# Patient Record
Sex: MALE | Race: AMERICAN INDIAN OR ALASKA NATIVE
[De-identification: names, ages, dates, MRNs, and addresses within clinical notes are randomized per-mention and may not be internally consistent; named-entity substitution may affect disease eponyms.]

---

## 2018-02-05 ENCOUNTER — HOSPITAL ENCOUNTER (EMERGENCY)
Dept: HOSPITAL 5 - ED | Age: 53
Discharge: HOME | End: 2018-02-05
Payer: SELF-PAY

## 2018-02-05 VITALS — SYSTOLIC BLOOD PRESSURE: 168 MMHG | DIASTOLIC BLOOD PRESSURE: 110 MMHG

## 2018-02-05 DIAGNOSIS — R73.01: ICD-10-CM

## 2018-02-05 DIAGNOSIS — I10: Primary | ICD-10-CM

## 2018-02-05 LAB
ALBUMIN SERPL-MCNC: 4.6 G/DL (ref 3.9–5)
ALT SERPL-CCNC: 23 UNITS/L (ref 7–56)
BASOPHILS # (AUTO): 0 K/MM3 (ref 0–0.1)
BASOPHILS NFR BLD AUTO: 0.2 % (ref 0–1.8)
BUN SERPL-MCNC: 14 MG/DL (ref 9–20)
BUN/CREAT SERPL: 13 %
CALCIUM SERPL-MCNC: 9.3 MG/DL (ref 8.4–10.2)
EOSINOPHIL # BLD AUTO: 0.1 K/MM3 (ref 0–0.4)
EOSINOPHIL NFR BLD AUTO: 0.7 % (ref 0–4.3)
HCT VFR BLD CALC: 42.8 % (ref 35.5–45.6)
HEMOLYSIS INDEX: 11
HGB BLD-MCNC: 14.6 GM/DL (ref 11.8–15.2)
LYMPHOCYTES # BLD AUTO: 1.7 K/MM3 (ref 1.2–5.4)
LYMPHOCYTES NFR BLD AUTO: 23.9 % (ref 13.4–35)
MCH RBC QN AUTO: 32 PG (ref 28–32)
MCHC RBC AUTO-ENTMCNC: 34 % (ref 32–34)
MCV RBC AUTO: 93 FL (ref 84–94)
MONOCYTES # (AUTO): 0.4 K/MM3 (ref 0–0.8)
MONOCYTES % (AUTO): 6.1 % (ref 0–7.3)
PLATELET # BLD: 144 K/MM3 (ref 140–440)
RBC # BLD AUTO: 4.6 M/MM3 (ref 3.65–5.03)

## 2018-02-05 PROCEDURE — 99283 EMERGENCY DEPT VISIT LOW MDM: CPT

## 2018-02-05 PROCEDURE — 85025 COMPLETE CBC W/AUTO DIFF WBC: CPT

## 2018-02-05 PROCEDURE — 80053 COMPREHEN METABOLIC PANEL: CPT

## 2018-02-05 PROCEDURE — 36415 COLL VENOUS BLD VENIPUNCTURE: CPT

## 2018-02-05 NOTE — EMERGENCY DEPARTMENT REPORT
HPI





- General


Chief Complaint: High BP


Time Seen by Provider: 02/05/18 09:55





- HPI


HPI: 





52-year-old -American male comes in for states that his blood pressures 

is high.  Patient reports that his blood pressure was elevated on Saturday when 

he first noticed it.  He denies any headaches no change in vision no chest pain 

no nausea no vomiting no fever no chills.  His blood pressure in triage is 152/

90 patient also denies any shortness of breathing.  He has no past medical 

history currently takes no medications and has no known drug allergies





ED Past Medical Hx





- Past Medical History


Previous Medical History?: No





- Surgical History


Past Surgical History?: No





- Social History


Smoking Status: Never Smoker


Substance Use Type: Alcohol





- Medications


Home Medications: 


 Home Medications











 Medication  Instructions  Recorded  Confirmed  Last Taken  Type


 


Lisinopril [Zestril] 10 mg PO QDAY 30 Days #30 tablet 02/05/18  Unknown Rx














ED Review of Systems


ROS: 


Stated complaint: HYPERTENSIVE


Other details as noted in HPI








Physical Exam





- Physical Exam


Vital Signs: 


 Vital Signs











  02/05/18





  08:48


 


Temperature 98.3 F


 


Pulse Rate 60


 


Respiratory 16





Rate 


 


Blood Pressure 152/90


 


O2 Sat by Pulse 98





Oximetry 











Physical Exam: 





GENERAL APPEARANCE: Well developed, well nourished, in no acute distress.


SKIN: Inspection of the skin reveals no rashes, ulcerations or petechiae.


HEENT: The sclerae were anicteric and conjunctivae were pink and moist. 

Extraocular movements were intact and pupils were equal, round, and reactive to 

light with normal accommodation. External inspection of the ears and nose 

showed no scars, lesions, or masses. Lips, teeth, and gums showed normal 

mucosa. The oral mucosa, hard and soft palate, tongue and posterior pharynx 

were normal.


NECK: Supple and symmetric. There was no thyroid enlargement, and no tenderness

, or masses were felt.


CHEST: Normal AP diameter and normal contour without any kyphoscoliosis.


LUNGS: Auscultation of the lungs revealed normal breath sounds without any 

other adventitious sounds or rubs.


CARDIOVASCULAR: There was a regular rate and rhythm without any murmurs, gallops

, rubs. The carotid pulses were normal and 2+ bilaterally without bruits. 

Peripheral pulses were 2+ and symmetric.


ABDOMEN: Soft and nontender with normal bowel sounds. The liver span was 

approximately 5-6 cm in the right midclavicular line by percussion. The liver 

edge was nontender. The spleen was not palpable. There were no inguinal or 

umbilical hernias noted. No ascites was noted.


LYMPH NODES: No lymphadenopathy was appreciated in the neck, axillae or groin.


MUSCULOSKELETAL: Gait was normal. There was no tenderness or effusions noted. 

Muscle strength and tone were normal.


EXTREMITIES: No cyanosis, clubbing or edema.


NEUROLOGIC: Alert and oriented x 3. Normal affect. Gait was normal. Normal deep 

tendon reflexes with no pathological reflexes. Sensation to touch was normal. 








ED Course


 Vital Signs











  02/05/18





  08:48


 


Temperature 98.3 F


 


Pulse Rate 60


 


Respiratory 16





Rate 


 


Blood Pressure 152/90


 


O2 Sat by Pulse 98





Oximetry 














ED Medical Decision Making





- Lab Data


Result diagrams: 


 02/05/18 10:13





 02/05/18 10:13





- Medical Decision Making





Patient has been evaluated by this provider fast track.  Discussed the patient 

will get a baseline labs the CBC CMP.  His kidney functions are stable and has 

no other reason for elevation of blood pressure we will start patient on a low-

dose of lisinopril 10 mg daily and have him follow-up with his primary care 

provider.  Patient verbalized understanding. 


Critical care attestation.: 


If time is entered above; I have spent that time in minutes in the direct care 

of this critically ill patient, excluding procedure time.








ED Disposition


Clinical Impression: 


 Elevated glucose





Hypertension


Qualifiers:


 Hypertension type: unspecified Qualified Code(s): I10 - Essential (primary) 

hypertension





Disposition: DC-01 TO HOME OR SELFCARE


Is pt being admited?: No


Does the pt Need Aspirin: No


Condition: Stable


Instructions:  Hypertension (ED), DASH Eating Plan (ED)


Additional Instructions: 


Please take the lisinopril 10 mg continue to monitor blood pressure is very 

important free to follow up with your primary care doctor this week.  Be aware 

that her blood sugar with a little elevated so he did need further evaluation 

which all come be done by her primary care provider.


Prescriptions: 


Lisinopril [Zestril] 10 mg PO QDAY 30 Days #30 tablet


Referrals: 


PRIMARY CARE,MD [Primary Care Provider] - 3-5 Days


Forms:  Work/School Release Form(ED)

## 2019-09-26 ENCOUNTER — HOSPITAL ENCOUNTER (EMERGENCY)
Dept: HOSPITAL 5 - ED | Age: 54
LOS: 1 days | Discharge: HOME | End: 2019-09-27
Payer: COMMERCIAL

## 2019-09-26 VITALS — DIASTOLIC BLOOD PRESSURE: 100 MMHG | SYSTOLIC BLOOD PRESSURE: 152 MMHG

## 2019-09-26 DIAGNOSIS — R50.9: ICD-10-CM

## 2019-09-26 DIAGNOSIS — I10: ICD-10-CM

## 2019-09-26 DIAGNOSIS — Z79.899: ICD-10-CM

## 2019-09-26 DIAGNOSIS — R11.0: ICD-10-CM

## 2019-09-26 DIAGNOSIS — R19.7: Primary | ICD-10-CM

## 2019-09-26 LAB
ALBUMIN SERPL-MCNC: 4.4 G/DL (ref 3.9–5)
ALT SERPL-CCNC: 47 UNITS/L (ref 7–56)
BUN SERPL-MCNC: 14 MG/DL (ref 9–20)
BUN/CREAT SERPL: 11 %
CALCIUM SERPL-MCNC: 9.2 MG/DL (ref 8.4–10.2)
HCT VFR BLD CALC: 46 % (ref 35.5–45.6)
HEMOLYSIS INDEX: 5
HGB BLD-MCNC: 15.1 GM/DL (ref 11.8–15.2)
MCHC RBC AUTO-ENTMCNC: 33 % (ref 32–34)
MCV RBC AUTO: 94 FL (ref 84–94)
PLATELET # BLD: 130 K/MM3 (ref 140–440)
RBC # BLD AUTO: 4.89 M/MM3 (ref 3.65–5.03)

## 2019-09-26 PROCEDURE — 36415 COLL VENOUS BLD VENIPUNCTURE: CPT

## 2019-09-26 PROCEDURE — 85027 COMPLETE CBC AUTOMATED: CPT

## 2019-09-26 PROCEDURE — 80053 COMPREHEN METABOLIC PANEL: CPT

## 2019-09-26 NOTE — EMERGENCY DEPARTMENT REPORT
Vomiting/Diarrhea





- HPI


Chief Complaint: Nausea/Vomiting/Diarrhea


Stated Complaint: DIARRHEA, NAUSEA X'S 2 DAYS


Time Seen by Provider: 09/26/19 21:49


Duration: 2 Days


Diarrhea Severity: Moderate


Pain Severity: None


Symptoms: Yes Watery Diarrhea, Yes Fever, Yes Able to Tolerate Fluids, Yes 

Recent Unusual Foods ([employer]), No Bloody diarrhea, No Recent use of 

Antibiotics, No Family w/ Similar Symptoms, No Contacts w/ Similar Symptoms, No 

Rash, No Hematuria, No Recent URI Symptoms


Other History: 53-year-old -American male presents to the emergency room 

complaining of diarrhea and fever 2 days.  Patient denies any abdominal pain.  

Patient reports that his last loose stool was prior to arrival.  Patient has 

taken nothing for his symptoms.  Patient reports that he had [employer] the 

other day and after that he started having nausea and diarrhea.  Patient denies 

any nausea at this time.  Patient reports he needs a refill on his amlodipine 10

mg.





ED Review of Systems


ROS: 


Stated complaint: DIARRHEA, NAUSEA X'S 2 DAYS


Other details as noted in HPI





Comment: All other systems reviewed and negative


Gastrointestinal: diarrhea





ED Past Medical Hx





- Past Medical History


Previous Medical History?: Yes


Hx Hypertension: Yes





- Surgical History


Past Surgical History?: No





- Social History


Smoking Status: Never Smoker


Substance Use Type: None





- Medications


Home Medications: 


                                Home Medications











 Medication  Instructions  Recorded  Confirmed  Last Taken  Type


 


Loperamide [Imodium] 2 mg PO Q2HR PRN #4 capsule 09/26/19  Unknown Rx


 


Losartan [Cozaar] 50 mg PO QDAY 2 Days #30 tablet 09/26/19  Unknown Rx


 


amLODIPine [Norvasc] 10 mg PO DAILY 09/26/19 09/26/19 Unknown History


 


amLODIPine [Norvasc] 10 mg PO DAILY #30 tab 09/26/19  Unknown Rx














Vomiting Diarrhea Exam





- Exam


General: 


Vital signs noted. No distress. Alert and acting appropriately.





HEENT: Yes Moist Mucous Membranes, No Pharyngeal Erythema, No Pharyngeal 

Exudates, No Rhinorrhea, No Conjuctival Injection, No Frontal Tenderness, No 

Maxillary Tenderness


Neck: No Adenopathy, No Rigidity


Lungs: Yes Clear Lung Sounds, Yes Good Air Exchange, No Wheezes, No Stridor, No 

Cough, No Nasal Flaring, No Retractions, No Use of Accessory Muscles


Heart exam: Regular: Yes, Murmur: No, Tachycardia: No


Abdomen: Tenderness: No, Peritoneal Signs: No, Distention: No, Hyperactive Bowel

 sounds: No


Skin exam: Rash: No, Edema: No, Normal turgor: Yes


Neurologic: 


Alert and oriented, no deficits.








Musculoskeletal: 


Unremarkable.











ED Course


                                   Vital Signs











  09/26/19





  21:35


 


Temperature 98.3 F


 


Pulse Rate 91 H


 


Respiratory 18





Rate 


 


Blood Pressure 152/100


 


O2 Sat by Pulse 96





Oximetry 














ED Medical Decision Making





- Lab Data


Result diagrams: 


                                 09/26/19 22:01





                                 09/26/19 22:01





- Medical Decision Making





53-year-old -American male presents to the emergency room complaining of 

diarrhea and fever 2 days.  Patient denies any abdominal pain.  Patient reports

 that his last loose stool was prior to arrival.  Patient has taken nothing for 

his symptoms.  Patient reports that he had [employer] the other day and after 

that he started having nausea and diarrhea.  Patient denies any nausea at this 

time.  Patient reports he needs a refill on his amlodipine 10 mg.








Patient will be given Imodium 2 mg now


Critical care attestation.: 


If time is entered above; I have spent that time in minutes in the direct care 

of this critically ill patient, excluding procedure time.








ED Disposition


Clinical Impression: 


Diarrhea


Qualifiers:


 Diarrhea type: unspecified type Qualified Code(s): R19.7 - Diarrhea, 

unspecified





HTN (hypertension)


Qualifiers:


 Hypertension type: essential hypertension Qualified Code(s): I10 - Essential 

(primary) hypertension





Disposition: DC-01 TO HOME OR SELFCARE


Is pt being admited?: No


Does the pt Need Aspirin: No


Condition: Stable


Instructions:  Loperamide (By mouth), Acute Diarrhea (ED), Hypertension (ED)


Prescriptions: 


Losartan [Cozaar] 50 mg PO QDAY 2 Days #30 tablet


Loperamide [Imodium] 2 mg PO Q2HR PRN #4 capsule


 PRN Reason: Diarrhea


amLODIPine [Norvasc] 10 mg PO DAILY #30 tab


Referrals: 


SRINATH ALMONTE MD [Staff Physician] - 3-5 Days


Forms:  Work/School Release Form(ED)

## 2019-09-26 NOTE — EVENT NOTE
ED Screening Note


Date of service: 09/26/19


Time: 21:49


ED Screening Note: 





This is a 53 y.o. M. that presents to the ER with N/D and fever x 2 days.





Reports eating a burger at [employer] 2 days ago and sick every since.





Reports fever and abdominal pain resolved.





+ diarrhea





Taking pepto bismol.





PMH of HTN





This initial assessment/diagnostic orders/clinical plan/treatment(s) is/are 

subject to change based on patients health status, clinical progression and re-

assessment by fellow clinical providers in the ED. Further treatment and workup 

at subsequent clinical providers discretion. Patient/guardian urged not to elope

from the ED as their condition may be serious if not clinically assessed and 

managed. 





Initial orders include:

## 2019-11-25 ENCOUNTER — HOSPITAL ENCOUNTER (EMERGENCY)
Dept: HOSPITAL 5 - ED | Age: 54
Discharge: HOME | End: 2019-11-25
Payer: COMMERCIAL

## 2019-11-25 VITALS — SYSTOLIC BLOOD PRESSURE: 143 MMHG | DIASTOLIC BLOOD PRESSURE: 105 MMHG
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
## 2019-11-25 DIAGNOSIS — S20.219A: Primary | ICD-10-CM
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
## 2019-11-25 LAB
ALBUMIN SERPL-MCNC: 5 G/DL (ref 3.9–5)
ALT SERPL-CCNC: 38 UNITS/L (ref 7–56)
BUN SERPL-MCNC: 12 MG/DL (ref 9–20)
BUN/CREAT SERPL: 11 %
CALCIUM SERPL-MCNC: 10.4 MG/DL (ref 8.4–10.2)
HEMOLYSIS INDEX: 10

## 2019-11-25 PROCEDURE — 36415 COLL VENOUS BLD VENIPUNCTURE: CPT

## 2019-11-25 PROCEDURE — 71275 CT ANGIOGRAPHY CHEST: CPT

## 2019-11-25 PROCEDURE — 99284 EMERGENCY DEPT VISIT MOD MDM: CPT

## 2019-11-25 PROCEDURE — 80053 COMPREHEN METABOLIC PANEL: CPT
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
## 2020-10-22 ENCOUNTER — HOSPITAL ENCOUNTER (EMERGENCY)
Dept: HOSPITAL 5 - ED | Age: 55
Discharge: LEFT BEFORE DISCHARGE | End: 2020-10-22
Payer: COMMERCIAL

## 2020-10-22 ENCOUNTER — HOSPITAL ENCOUNTER (EMERGENCY)
Dept: HOSPITAL 5 - ED | Age: 55
Discharge: HOME | End: 2020-10-22
Payer: COMMERCIAL

## 2020-10-22 VITALS — SYSTOLIC BLOOD PRESSURE: 166 MMHG | DIASTOLIC BLOOD PRESSURE: 98 MMHG

## 2020-10-22 DIAGNOSIS — Z79.899: ICD-10-CM

## 2020-10-22 DIAGNOSIS — I10: Primary | ICD-10-CM

## 2020-10-22 DIAGNOSIS — Z53.21: ICD-10-CM

## 2020-10-22 PROCEDURE — 99281 EMR DPT VST MAYX REQ PHY/QHP: CPT

## 2020-10-22 NOTE — EMERGENCY DEPARTMENT REPORT
ED General Adult HPI





- General


Chief complaint: Extremity Injury, Lower


Stated complaint: HYPERTENSIVE


Time Seen by Provider: 10/22/20 16:09


Source: patient


Mode of arrival: Ambulatory


Limitations: No Limitations





- History of Present Illness


Initial comments: 





Patient is a 54-year-old male presents emergency room with complaints of 

elevated blood pressure that began yesterday.  He states that he has not taken 

his blood pressure medication since December 2019.  He states that he just had a

feeling that his blood pressure was elevated.  He denies any headache, vision 

changes, numbness, weakness, chest pain, shortness of breath, tingling.  He 

denies any other past medical history.  He states he supposed to be taking 

amlodipine 10 mg daily.  He denies any other past medical history.  No allergies

medications.  He states that he went on vacation and drank more than he usually 

does and believes that is what set off his blood pressure.


Severity scale (0 -10): 4





- Related Data


                                Home Medications











 Medication  Instructions  Recorded  Confirmed  Last Taken


 


amLODIPine 10 mg PO DAILY 09/26/19 09/26/19 Unknown








                                  Previous Rx's











 Medication  Instructions  Recorded  Last Taken  Type


 


Loperamide [Imodium] 2 mg PO Q2HR PRN #4 capsule 09/26/19 Unknown Rx


 


Losartan [Cozaar] 50 mg PO QDAY 2 Days #30 tablet 09/26/19 Unknown Rx


 


amLODIPine 10 mg PO DAILY #30 tab 09/26/19 Unknown Rx


 


Ibuprofen [Motrin 800 MG tab] 800 mg PO Q8HR PRN #30 tablet 11/25/19 Unknown Rx


 


amLODIPine 10 mg PO DAILY #30 tablet 10/22/20 Unknown Rx











                                    Allergies











Allergy/AdvReac Type Severity Reaction Status Date / Time


 


No Known Allergies Allergy   Verified 11/25/19 20:56














ED Review of Systems


ROS: 


Stated complaint: HYPERTENSIVE


Other details as noted in HPI





Comment: All other systems reviewed and negative





ED Past Medical Hx





- Past Medical History


Previous Medical History?: Yes


Hx Hypertension: Yes





- Surgical History


Past Surgical History?: No





- Social History


Smoking Status: Never Smoker


Substance Use Type: Alcohol





- Medications


Home Medications: 


                                Home Medications











 Medication  Instructions  Recorded  Confirmed  Last Taken  Type


 


Loperamide [Imodium] 2 mg PO Q2HR PRN #4 capsule 09/26/19  Unknown Rx


 


Losartan [Cozaar] 50 mg PO QDAY 2 Days #30 tablet 09/26/19  Unknown Rx


 


amLODIPine 10 mg PO DAILY 09/26/19 09/26/19 Unknown History


 


amLODIPine 10 mg PO DAILY #30 tab 09/26/19  Unknown Rx


 


Ibuprofen [Motrin 800 MG tab] 800 mg PO Q8HR PRN #30 tablet 11/25/19  Unknown Rx


 


amLODIPine 10 mg PO DAILY #30 tablet 10/22/20  Unknown Rx














ED Physical Exam





- General


Limitations: No Limitations


General appearance: alert, in no apparent distress





- Head


Head exam: Present: atraumatic, normocephalic





- Eye


Eye exam: Present: normal appearance





- ENT


ENT exam: Present: mucous membranes moist





- Respiratory


Respiratory exam: Present: normal lung sounds bilaterally.  Absent: respiratory 

distress, wheezes, rales, rhonchi, stridor, chest wall tenderness, accessory 

muscle use, decreased breath sounds, prolonged expiratory





- Cardiovascular


Cardiovascular Exam: Present: regular rate, normal rhythm, normal heart sounds. 

Absent: systolic murmur, diastolic murmur, rubs, gallop





- Neurological Exam


Neurological exam: Present: alert, oriented X3, CN II-XII intact, normal gait.  

Absent: motor sensory deficit





- Psychiatric


Psychiatric exam: Present: normal affect, normal mood





- Skin


Skin exam: Present: warm, dry, intact





ED Course


                                   Vital Signs











  10/22/20





  13:48


 


Temperature 97.9 F


 


Pulse Rate 78


 


Respiratory 18





Rate 


 


Blood Pressure 166/98





[Right] 


 


O2 Sat by Pulse 98





Oximetry 














ED Medical Decision Making





- Medical Decision Making





Patient is a 54-year-old male presents emergency room with complaints of 

elevated blood pressure that began yesterday.  He states that he has not taken 

his blood pressure medication since December 2019.  He states that he just had a

 feeling that his blood pressure was elevated.  He denies any headache, vision 

changes, numbness, weakness, chest pain, shortness of breath, tingling.  He 

denies any other past medical history.  He states he supposed to be taking 

amlodipine 10 mg daily.  He denies any other past medical history.  No allergies

 medications.  He states that he went on vacation and drank more than he usually

 does and believes that is what set off his blood pressure.  Vitals with 

elevated blood pressure, otherwise stable.  Patient is noncompliant with his 

blood pressure medication.  No abnormality on physical examination as documented

 in chart.  Patient does not have any clinical signs of hypertensive 

urgency/emergency.  Patient given a 1 month refill supply of his home 

medication.  Advised patient Please take medication as prescribed.  Please keep 

a blood pressure log and take this to the primary care doctor.  Increase your 

water intake.  Eat a low-sodium/low salt diet.  Incorporate 30 to 60 minutes of 

aerobic exercise daily.  Avoid alcohol abuse.  Return to the emergency room for 

any new or worsening symptoms.


Critical care attestation.: 


If time is entered above; I have spent that time in minutes in the direct care 

of this critically ill patient, excluding procedure time.








ED Disposition


Clinical Impression: 


Hypertension


Qualifiers:


 Hypertension type: unspecified Qualified Code(s): I10 - Essential (primary) 

hypertension





Disposition: DC-01 TO HOME OR SELFCARE


Is pt being admited?: No


Does the pt Need Aspirin: No


Condition: Stable


Instructions:  Low Sodium Diet (ED), Hypertension (ED)


Additional Instructions: 


Please take medication as prescribed.  Please keep a blood pressure log and take

 this to the primary care doctor.  Increase your water intake.  Eat a low-

sodium/low salt diet.  Incorporate 30 to 60 minutes of aerobic exercise daily.  

Avoid alcohol abuse.  Return to the emergency room for any new or worsening 

symptoms.


Prescriptions: 


amLODIPine 10 mg PO DAILY #30 tablet


Referrals: 


your, primary care doctor [Other] - 2-3 Days


Forms:  Work/School Release Form(ED)


Time of Disposition: 16:11


Print Language: ENGLISH

## 2020-12-04 ENCOUNTER — HOSPITAL ENCOUNTER (EMERGENCY)
Dept: HOSPITAL 5 - ED | Age: 55
Discharge: HOME | End: 2020-12-04
Payer: COMMERCIAL

## 2020-12-04 VITALS — SYSTOLIC BLOOD PRESSURE: 182 MMHG | DIASTOLIC BLOOD PRESSURE: 105 MMHG

## 2020-12-04 DIAGNOSIS — I10: Primary | ICD-10-CM

## 2020-12-04 DIAGNOSIS — Z79.899: ICD-10-CM

## 2020-12-04 PROCEDURE — 99281 EMR DPT VST MAYX REQ PHY/QHP: CPT

## 2021-02-19 ENCOUNTER — HOSPITAL ENCOUNTER (EMERGENCY)
Dept: HOSPITAL 5 - ED | Age: 56
Discharge: HOME | End: 2021-02-19
Payer: COMMERCIAL

## 2021-02-19 VITALS — DIASTOLIC BLOOD PRESSURE: 102 MMHG | SYSTOLIC BLOOD PRESSURE: 159 MMHG

## 2021-02-19 DIAGNOSIS — Z79.899: ICD-10-CM

## 2021-02-19 DIAGNOSIS — M25.562: Primary | ICD-10-CM

## 2021-02-19 DIAGNOSIS — I10: ICD-10-CM

## 2021-02-19 PROCEDURE — 99283 EMERGENCY DEPT VISIT LOW MDM: CPT

## 2021-02-19 NOTE — XRAY REPORT
Left knee-3 views



INDICATION:  l knee pain.



COMPARISON: None.



IMPRESSION:  Generalized soft tissue swelling along the anterior aspect of the knee with no obvious s
oft tissue wound or radiopaque foreign body identified.  No acute fracture or malalignment. Mild medi
al compartment DJD.



Signer Name: Jaciel Boo MD 

Signed: 2/19/2021 7:59 AM

Workstation Name: [id number]

## 2021-02-19 NOTE — EMERGENCY DEPARTMENT REPORT
ED General Adult HPI





- General


Stated complaint: LT KNEE SWOLLEN


PUI?: No


Time Seen by Provider: 02/19/21 07:11





- History of Present Illness


Initial comments: 





55-year-old male presents with chief complaint of left knee pain, gradual onset 

over the past 3 days.  According to the patient he was at work on Tuesday when 

he was moving a shelf and a box fell down hitting him on the knee.  He states 

initially it did not hurt very much and he was able to continue doing his job 

but yesterday the pain began to increase.  He reports associated swelling though

states that has improved after icing the knee.  Pain is worse with movement

/ambulation, better with rest.  Rated as moderate.  No other injuries or 

complaints.  No numbness or weakness.





- Related Data


                                  Previous Rx's











 Medication  Instructions  Recorded  Last Taken  Type


 


Loperamide [Imodium] 2 mg PO Q2HR PRN #4 capsule 09/26/19 Unknown Rx


 


amLODIPine 10 mg PO DAILY #30 tab 09/26/19 Unknown Rx


 


Ibuprofen [Motrin 800 MG tab] 800 mg PO Q8HR PRN #30 tablet 11/25/19 Unknown Rx


 


amLODIPine 10 mg PO DAILY #30 tablet 10/22/20 Unknown Rx


 


Losartan [Cozaar] 50 mg PO QDAY #90 tablet 12/04/20 Unknown Rx


 


amLODIPine 10 mg PO DAILY #90 tab 12/04/20 Unknown Rx


 


Acetaminophen/Codeine [Tylenol 1 tab PO Q6H PRN #12 tab 02/19/21 Unknown Rx





/Codeine # 3 tab]    


 


Naproxen [Naprosyn] 500 mg PO BID #20 tablet 02/19/21 Unknown Rx











                                    Allergies











Allergy/AdvReac Type Severity Reaction Status Date / Time


 


No Known Allergies Allergy   Verified 12/04/20 09:12














ED Review of Systems


ROS: 


Stated complaint: LT KNEE SWOLLEN


Other details as noted in HPI





Comment: All other systems reviewed and negative


Musculoskeletal: as per HPI





ED Past Medical Hx





- Past Medical History


Hx Hypertension: Yes





- Social History


Smoking Status: Never Smoker


Substance Use Type: Alcohol





- Medications


Home Medications: 


                                Home Medications











 Medication  Instructions  Recorded  Confirmed  Last Taken  Type


 


Loperamide [Imodium] 2 mg PO Q2HR PRN #4 capsule 09/26/19  Unknown Rx


 


amLODIPine 10 mg PO DAILY #30 tab 09/26/19  Unknown Rx


 


Ibuprofen [Motrin 800 MG tab] 800 mg PO Q8HR PRN #30 tablet 11/25/19  Unknown Rx


 


amLODIPine 10 mg PO DAILY #30 tablet 10/22/20  Unknown Rx


 


Losartan [Cozaar] 50 mg PO QDAY #90 tablet 12/04/20  Unknown Rx


 


amLODIPine 10 mg PO DAILY #90 tab 12/04/20  Unknown Rx


 


Acetaminophen/Codeine [Tylenol 1 tab PO Q6H PRN #12 tab 02/19/21  Unknown Rx





/Codeine # 3 tab]     


 


Naproxen [Naprosyn] 500 mg PO BID #20 tablet 02/19/21  Unknown Rx














ED Physical Exam





- General


Limitations: No Limitations


General appearance: alert, in no apparent distress





- Head


Head exam: Present: atraumatic, normocephalic





- Extremities Exam


Extremities exam: Present: tenderness, other (Mild swelling to the left knee 

with tenderness over the patella and patellar tendon, normal pulses distally)





- Back Exam


Back exam: Present: normal inspection, full ROM





- Neurological Exam


Neurological exam: Present: alert, oriented X3





- Psychiatric


Psychiatric exam: Present: normal affect, normal mood





- Skin


Skin exam: Present: warm, dry, intact





ED Course


                                   Vital Signs











  02/19/21





  07:12


 


Temperature 98.2 F


 


Pulse Rate 78


 


Respiratory 16





Rate 


 


Blood Pressure 159/102


 


O2 Sat by Pulse 100





Oximetry 














ED Medical Decision Making





- Radiology Data


Radiology results: report reviewed





Soft tissue swelling, no fracture of left knee





- Medical Decision Making





Patient presents with left knee pain after an injury.  On my exam reproducible 

pain over the patella and patellar tendon.  Remainder of lower extremity exam is

 normal, neurovascular intact.  X-ray pending.





Follow-up Ortho.  Knee brace given.





- Differential Diagnosis


Contusion, fracture, sprain


Critical care attestation.: 


If time is entered above; I have spent that time in minutes in the direct care 

of this critically ill patient, excluding procedure time.








ED Disposition


Clinical Impression: 


Left knee pain


Qualifiers:


 Chronicity: acute Qualified Code(s): M25.562 - Pain in left knee





Disposition: DC-01 TO HOME OR SELFCARE


Is pt being admited?: No


Condition: Stable


Instructions:  Acute Knee Pain, Adult


Prescriptions: 


Naproxen [Naprosyn] 500 mg PO BID #20 tablet


Acetaminophen/Codeine [Tylenol /Codeine # 3 tab] 1 tab PO Q6H PRN #12 tab


 PRN Reason: Pain , Severe (7-10)


Referrals: 


RAO BOOTH MD [Staff Physician] - 3-5 Days


Time of Disposition: 08:11

## 2021-04-08 ENCOUNTER — HOSPITAL ENCOUNTER (EMERGENCY)
Dept: HOSPITAL 5 - ED | Age: 56
Discharge: HOME | End: 2021-04-08
Payer: COMMERCIAL

## 2021-04-08 VITALS — SYSTOLIC BLOOD PRESSURE: 163 MMHG | DIASTOLIC BLOOD PRESSURE: 98 MMHG

## 2021-04-08 DIAGNOSIS — Z76.0: ICD-10-CM

## 2021-04-08 DIAGNOSIS — M25.562: Primary | ICD-10-CM

## 2021-04-08 DIAGNOSIS — Z79.899: ICD-10-CM

## 2021-04-08 DIAGNOSIS — I10: ICD-10-CM

## 2021-04-08 PROCEDURE — 99282 EMERGENCY DEPT VISIT SF MDM: CPT

## 2021-04-08 NOTE — EMERGENCY DEPARTMENT REPORT
ED Extremity Problem HPI





- General


Chief complaint: Extremity Injury, Lower


Stated complaint: LEFT KNEE INJURY/PAIN


Time Seen by Provider: 04/08/21 07:35


Source: patient


Mode of arrival: Ambulatory


Limitations: Physical Limitation





- History of Present Illness


Initial comments: 


55-year-old male presents to the ER today with complaints of left knee pain. 


Patient states that about 1/2 months ago case of liquid coffee fell onto his 

left knee.  Patient states that he was seen and evaluated here.  Had x-rays 

which showed no dislocation or fractures.  He was given referral to Dr. Saavedra 

whom he followed up with.  He states that Dr. Saavedra during the effusion that he

had in the knee.  He was off for about a month and then felt better enough to 

return to work 2 weeks ago.  Patient states that this past Sunday when he woke 

up his left knee was stiff, painful and he was having difficulty walking.  He 

reports mild swelling to the knee.  He states that he has an appointment Dr. Saavedra this coming Monday but he took 3 days off, and his job is requiring him 

to have a work note. He denies any injury recently. He denies any bruising or 

erythema. He denies calf pain, chest pain or SOB. 


Patient also requesting refill on his BP meds. He states he has been out of his 

amlodipine for couple days. 


MD Complaint: joint swelling, joint paint, other (Left knee pain )


-: Gradual





- Related Data


                                  Previous Rx's











 Medication  Instructions  Recorded  Last Taken  Type


 


Loperamide [Imodium] 2 mg PO Q2HR PRN #4 capsule 09/26/19 Unknown Rx


 


amLODIPine 10 mg PO DAILY #30 tab 09/26/19 Unknown Rx


 


Losartan [Cozaar] 50 mg PO QDAY #90 tablet 12/04/20 Unknown Rx


 


amLODIPine 10 mg PO DAILY #90 tab 12/04/20 Unknown Rx


 


Acetaminophen/Codeine [Tylenol 1 tab PO Q6H PRN #12 tab 02/19/21 Unknown Rx





/Codeine # 3 tab]    


 


Naproxen [Naprosyn] 500 mg PO BID #20 tablet 02/19/21 Unknown Rx


 


Ibuprofen [Motrin 800 MG tab] 800 mg PO Q8HR PRN #30 tablet 04/08/21 Unknown Rx


 


amLODIPine 10 mg PO DAILY #30 tablet 04/08/21 Unknown Rx











                                    Allergies











Allergy/AdvReac Type Severity Reaction Status Date / Time


 


No Known Allergies Allergy   Verified 04/08/21 06:25














ED Review of Systems


ROS: 


Stated complaint: LEFT KNEE INJURY/PAIN


Other details as noted in HPI





Comment: All other systems reviewed and negative


Constitutional: denies: chills, fever


Eyes: denies: eye pain, eye discharge, vision change


ENT: denies: ear pain, throat pain, dental pain, hearing loss, epistaxis


Respiratory: denies: cough, shortness of breath, SOB with exertion, SOB at rest,

stridor, wheezing


Cardiovascular: denies: chest pain, palpitations, dyspnea on exertion, edema, 

syncope, paroxysmal nocturnal dyspnea


Endocrine: no symptoms reported


Gastrointestinal: denies: abdominal pain, nausea, vomiting, diarrhea, 

constipation, hematemesis, melena, hematochezia


Musculoskeletal: joint swelling, arthralgia


Neurological: denies: headache, weakness, numbness, paresthesias, confusion, 

abnormal gait, vertigo


Psychiatric: denies: anxiety, depression, auditory hallucinations, visual 

hallucinations, homicidal thoughts, suicidal thoughts


Hematological/Lymphatic: denies: easy bleeding, easy bruising





ED Past Medical Hx





- Past Medical History


Previous Medical History?: No


Hx Hypertension: Yes





- Surgical History


Past Surgical History?: No





- Social History


Smoking Status: Never Smoker


Substance Use Type: None





- Medications


Home Medications: 


                                Home Medications











 Medication  Instructions  Recorded  Confirmed  Last Taken  Type


 


Loperamide [Imodium] 2 mg PO Q2HR PRN #4 capsule 09/26/19  Unknown Rx


 


amLODIPine 10 mg PO DAILY #30 tab 09/26/19  Unknown Rx


 


Losartan [Cozaar] 50 mg PO QDAY #90 tablet 12/04/20  Unknown Rx


 


amLODIPine 10 mg PO DAILY #90 tab 12/04/20  Unknown Rx


 


Acetaminophen/Codeine [Tylenol 1 tab PO Q6H PRN #12 tab 02/19/21  Unknown Rx





/Codeine # 3 tab]     


 


Naproxen [Naprosyn] 500 mg PO BID #20 tablet 02/19/21  Unknown Rx


 


Ibuprofen [Motrin 800 MG tab] 800 mg PO Q8HR PRN #30 tablet 04/08/21  Unknown Rx


 


amLODIPine 10 mg PO DAILY #30 tablet 04/08/21  Unknown Rx














ED Physical Exam





- General


Limitations: Physical Limitation


General appearance: alert, in no apparent distress





- Head


Head exam: Present: atraumatic, normocephalic, normal inspection





- Respiratory


Respiratory exam: Present: normal lung sounds bilaterally.  Absent: respiratory 

distress





- Cardiovascular


Cardiovascular Exam: Present: regular rate, normal rhythm, normal heart sounds





- Extremities Exam


Extremities exam: Present: normal inspection, full ROM.  Absent: pedal edema, 

calf tenderness





- Expanded Lower Extremity Exam


  ** Left


Knee exam: Present: full ROM, tenderness, full knee extension.  Absent: normal 

inspection, swelling, abrasion, laceration, ecchymosis, deformity, crepidus, 

dislocation, erythema, effusion


Neuro vascular tendon exam: Present: no vascular compromise.  Absent: motor 

deficit, sensory deficit





- Back Exam


Back exam: Present: normal inspection





- Neurological Exam


Neurological exam: Present: alert, oriented X3, CN II-XII intact, normal gait





- Psychiatric


Psychiatric exam: Present: normal affect, normal mood





- Skin


Skin exam: Present: intact





ED Course


                                   Vital Signs











  04/08/21





  06:27


 


Temperature 98.3 F


 


Pulse Rate 89


 


Respiratory 18





Rate 


 


Blood Pressure 163/98


 


O2 Sat by Pulse 98





Oximetry 











Critical care attestation.: 


If time is entered above; I have spent that time in minutes in the direct care 

of this critically ill patient, excluding procedure time.








ED Disposition


Clinical Impression: 


 Knee pain, left, Medication refill





Disposition: DC-01 TO HOME OR SELFCARE


Is pt being admited?: No


Does the pt Need Aspirin: No


Condition: Stable


Instructions:  Acute Knee Pain, Adult, Medicine Refill at the Emergency 

Department


Additional Instructions: 


Continue to rest, and elevate knee. Take the motrin as prescribed. Take your 

blood pressure as prescribed. Keep your appointment with Dr Quentin santiago monday. 

Return to ED if worse. 


Prescriptions: 


amLODIPine 10 mg PO DAILY #30 tablet


Ibuprofen [Motrin 800 MG tab] 800 mg PO Q8HR PRN #30 tablet


 PRN Reason: Pain , Severe (7-10)


Referrals: 


PRIMARY CARE,MD [Primary Care Provider] - 3-5 Days


Forms:  Work/School Release Form(ED)


Time of Disposition: 07:46

## 2021-05-26 ENCOUNTER — HOSPITAL ENCOUNTER (OUTPATIENT)
Dept: HOSPITAL 5 - LAB | Age: 56
Discharge: HOME | End: 2021-05-26
Attending: INTERNAL MEDICINE
Payer: COMMERCIAL

## 2021-05-26 DIAGNOSIS — R25.2: ICD-10-CM

## 2021-05-26 DIAGNOSIS — Z13.29: ICD-10-CM

## 2021-05-26 DIAGNOSIS — E55.9: ICD-10-CM

## 2021-05-26 DIAGNOSIS — Z13.220: Primary | ICD-10-CM

## 2021-05-26 DIAGNOSIS — Z12.5: ICD-10-CM

## 2021-05-26 DIAGNOSIS — Z13.1: ICD-10-CM

## 2021-05-26 DIAGNOSIS — Z00.00: ICD-10-CM

## 2021-05-26 LAB
ALBUMIN SERPL-MCNC: 4.2 G/DL (ref 3.9–5)
ALT SERPL-CCNC: 27 UNITS/L (ref 7–56)
BASOPHILS # (AUTO): 0 K/MM3 (ref 0–0.1)
BASOPHILS NFR BLD AUTO: 0.3 % (ref 0–1.8)
BUN SERPL-MCNC: 13 MG/DL (ref 9–20)
BUN/CREAT SERPL: 12 %
CALCIUM SERPL-MCNC: 8.9 MG/DL (ref 8.4–10.2)
EOSINOPHIL # BLD AUTO: 0.1 K/MM3 (ref 0–0.4)
EOSINOPHIL NFR BLD AUTO: 1.7 % (ref 0–4.3)
HCT VFR BLD CALC: 41.6 % (ref 35.5–45.6)
HDLC SERPL-MCNC: 38 MG/DL (ref 40–59)
HEMOLYSIS INDEX: 16
HGB BLD-MCNC: 14.1 GM/DL (ref 11.8–15.2)
LYMPHOCYTES # BLD AUTO: 1.5 K/MM3 (ref 1.2–5.4)
LYMPHOCYTES NFR BLD AUTO: 22.3 % (ref 13.4–35)
MCHC RBC AUTO-ENTMCNC: 34 % (ref 32–34)
MCV RBC AUTO: 97 FL (ref 84–94)
MONOCYTES # (AUTO): 0.5 K/MM3 (ref 0–0.8)
MONOCYTES % (AUTO): 7.2 % (ref 0–7.3)
PLATELET # BLD: 155 K/MM3 (ref 140–440)
RBC # BLD AUTO: 4.29 M/MM3 (ref 3.65–5.03)

## 2021-05-26 PROCEDURE — 85025 COMPLETE CBC W/AUTO DIFF WBC: CPT

## 2021-05-26 PROCEDURE — 83036 HEMOGLOBIN GLYCOSYLATED A1C: CPT

## 2021-05-26 PROCEDURE — 84153 ASSAY OF PSA TOTAL: CPT

## 2021-05-26 PROCEDURE — 83735 ASSAY OF MAGNESIUM: CPT

## 2021-05-26 PROCEDURE — 36415 COLL VENOUS BLD VENIPUNCTURE: CPT

## 2021-05-26 PROCEDURE — 82607 VITAMIN B-12: CPT

## 2021-05-26 PROCEDURE — 82306 VITAMIN D 25 HYDROXY: CPT

## 2021-05-26 PROCEDURE — 84443 ASSAY THYROID STIM HORMONE: CPT

## 2021-05-26 PROCEDURE — 84100 ASSAY OF PHOSPHORUS: CPT

## 2021-05-26 PROCEDURE — 80053 COMPREHEN METABOLIC PANEL: CPT

## 2021-05-26 PROCEDURE — 80061 LIPID PANEL: CPT

## 2021-05-30 LAB — VITAMIN D2 SERPL-MCNC: <4 NG/ML

## 2021-09-08 ENCOUNTER — HOSPITAL ENCOUNTER (OUTPATIENT)
Dept: HOSPITAL 5 - LAB | Age: 56
Discharge: HOME | End: 2021-09-08
Attending: INTERNAL MEDICINE
Payer: COMMERCIAL

## 2021-09-08 DIAGNOSIS — E78.5: Primary | ICD-10-CM

## 2021-09-08 LAB — HDLC SERPL-MCNC: 38 MG/DL (ref 40–59)

## 2021-09-08 PROCEDURE — 80061 LIPID PANEL: CPT

## 2021-09-08 PROCEDURE — 36415 COLL VENOUS BLD VENIPUNCTURE: CPT

## 2021-10-05 ENCOUNTER — HOSPITAL ENCOUNTER (OUTPATIENT)
Dept: HOSPITAL 5 - MRI | Age: 56
Discharge: HOME | End: 2021-10-05
Attending: ORTHOPAEDIC SURGERY
Payer: COMMERCIAL

## 2021-10-05 DIAGNOSIS — R22.41: Primary | ICD-10-CM

## 2021-10-05 NOTE — MAGNETIC RESONANCE REPORT
MRI RIGHT FORELEG WITHOUT CONTRAST



HISTORY: Localized swelling/mass



COMPARISON: None. 



TECHNIQUE: Routine non-contrast MRI of the left forearm obtained.



CONTRAST: None.



FINDINGS:

Bone Marrow: No significant abnormality.

Muscles: No significant abnormality.

Subcutaneous soft tissues: Well encapsulated hyperintense T2/isointense T1 cystic mass within the sub
cutaneous soft tissues anterior lateral mid foreleg with some hypointense thin septations and hypoint
ense T2 foci within it. The lesion measures 1.9 cm in maximum diameter. No surrounding subcutaneous s
oft tissue edema or inflammation.



Additional Findings: None.



IMPRESSION:

1. Exophytic complex cystic mass within the subcutaneous soft tissues/dermis/epidermis of right mid l
ateral foreleg. The lesion is nonspecific. Favor epidermoid inclusion cyst. Other differential consid
erations would include atypical myxoid tumor or acute amount of fibrosarcoma protuberans. The lesion 
is easily amenable to surgical resection and can be shelled out easily with well defined capsule



[email or web address]/[email or web address]



Signer Name: Piero Chan MD 

Signed: 10/5/2021 4:22 PM

Workstation Name: [id number]

## 2021-10-28 ENCOUNTER — HOSPITAL ENCOUNTER (OUTPATIENT)
Dept: HOSPITAL 5 - OR | Age: 56
Discharge: HOME | End: 2021-10-28
Attending: ORTHOPAEDIC SURGERY
Payer: COMMERCIAL

## 2021-10-28 VITALS — DIASTOLIC BLOOD PRESSURE: 81 MMHG | SYSTOLIC BLOOD PRESSURE: 147 MMHG

## 2021-10-28 DIAGNOSIS — R22.41: Primary | ICD-10-CM

## 2021-10-28 DIAGNOSIS — Z79.899: ICD-10-CM

## 2021-10-28 DIAGNOSIS — I10: ICD-10-CM

## 2021-10-28 DIAGNOSIS — Z98.890: ICD-10-CM

## 2021-10-28 DIAGNOSIS — L72.11: ICD-10-CM

## 2021-10-28 DIAGNOSIS — Z20.822: ICD-10-CM

## 2021-10-28 PROCEDURE — U0003 INFECTIOUS AGENT DETECTION BY NUCLEIC ACID (DNA OR RNA); SEVERE ACUTE RESPIRATORY SYNDROME CORONAVIRUS 2 (SARS-COV-2) (CORONAVIRUS DISEASE [COVID-19]), AMPLIFIED PROBE TECHNIQUE, MAKING USE OF HIGH THROUGHPUT TECHNOLOGIES AS DESCRIBED BY CMS-2020-01-R: HCPCS

## 2021-10-28 PROCEDURE — 88307 TISSUE EXAM BY PATHOLOGIST: CPT

## 2021-10-28 PROCEDURE — 11403 EXC TR-EXT B9+MARG 2.1-3CM: CPT

## 2021-10-28 PROCEDURE — 88304 TISSUE EXAM BY PATHOLOGIST: CPT

## 2021-10-28 NOTE — ANESTHESIA CONSULTATION
Anesthesia Consult and Med Hx


Date of service: 10/28/21





- Airway


Anesthetic Teeth Evaluation: Good


ROM Head & Neck: Adequate


Mental/Hyoid Distance: Adequate


Mallampati Class: Class II


Intubation Access Assessment: Probably Good





- Pre-Operative Health Status


ASA Pre-Surgery Classification: ASA2


Proposed Anesthetic Plan: MAC





- Pre-Anesthesia Comment


Pre-Anesthesia Comments: Discussed MAC vs GA. Patient requests MAC but is 

agreeable to GA if necessary.





- Pulmonary


Hx Smoking: No


Hx Respiratory Symptoms: No


Hx Sleep Apnea: No (HIGH ON LAXMI  PRESCREEN)





- Cardiovascular System


Hx Hypertension: Yes (took amlodipine this morning)


Hx Heart Attack/AMI: No





- Central Nervous System


CVA: No





- Endocrine


Hx Renal Disease: No


Hx Liver Disease: No


Hx Insulin Dependent Diabetes: No


Hx Non-Insulin Dependent Diabetes: No


Hx Thyroid Disease: No





- Hematic


Hx Anemia: No


Hx Sickle Cell Disease: No





- Other Systems


Hx Obesity: No





- Additional Comments


Anesthesia Medical History Comments: No prior GA. No FHx anesthetic 

complications.

## 2022-01-20 ENCOUNTER — HOSPITAL ENCOUNTER (OUTPATIENT)
Dept: HOSPITAL 5 - LAB | Age: 57
Discharge: HOME | End: 2022-01-20
Attending: INTERNAL MEDICINE
Payer: COMMERCIAL

## 2022-01-20 DIAGNOSIS — E78.5: Primary | ICD-10-CM

## 2022-01-20 LAB — HDLC SERPL-MCNC: 40 MG/DL (ref 40–59)

## 2022-01-20 PROCEDURE — 80061 LIPID PANEL: CPT

## 2022-01-20 PROCEDURE — 36415 COLL VENOUS BLD VENIPUNCTURE: CPT

## 2022-06-17 ENCOUNTER — HOSPITAL ENCOUNTER (OUTPATIENT)
Dept: HOSPITAL 5 - LAB | Age: 57
Discharge: HOME | End: 2022-06-17
Attending: INTERNAL MEDICINE
Payer: COMMERCIAL

## 2022-06-17 DIAGNOSIS — E78.5: ICD-10-CM

## 2022-06-17 DIAGNOSIS — R53.83: ICD-10-CM

## 2022-06-17 DIAGNOSIS — R73.9: ICD-10-CM

## 2022-06-17 DIAGNOSIS — I10: ICD-10-CM

## 2022-06-17 DIAGNOSIS — E55.9: ICD-10-CM

## 2022-06-17 DIAGNOSIS — Z00.00: Primary | ICD-10-CM

## 2022-06-17 LAB
ALBUMIN SERPL-MCNC: 4.7 G/DL (ref 3.9–5)
ALT SERPL-CCNC: 38 UNITS/L (ref 7–56)
BASOPHILS # (AUTO): 0 K/MM3 (ref 0–0.1)
BASOPHILS NFR BLD AUTO: 0.5 % (ref 0–1.8)
BUN SERPL-MCNC: 17 MG/DL (ref 9–20)
BUN/CREAT SERPL: 12 %
CALCIUM SERPL-MCNC: 9.5 MG/DL (ref 8.4–10.2)
EOSINOPHIL # BLD AUTO: 0.1 K/MM3 (ref 0–0.4)
EOSINOPHIL NFR BLD AUTO: 1.3 % (ref 0–4.3)
HCT VFR BLD CALC: 42.1 % (ref 35.5–45.6)
HDLC SERPL-MCNC: 39 MG/DL (ref 40–59)
HEMOLYSIS INDEX: 10
HGB BLD-MCNC: 14.3 GM/DL (ref 11.8–15.2)
LYMPHOCYTES # BLD AUTO: 1.8 K/MM3 (ref 1.2–5.4)
LYMPHOCYTES NFR BLD AUTO: 23.7 % (ref 13.4–35)
MCHC RBC AUTO-ENTMCNC: 34 % (ref 32–34)
MCV RBC AUTO: 93 FL (ref 84–94)
MONOCYTES # (AUTO): 0.5 K/MM3 (ref 0–0.8)
MONOCYTES % (AUTO): 6.3 % (ref 0–7.3)
PLATELET # BLD: 144 K/MM3 (ref 140–440)
RBC # BLD AUTO: 4.51 M/MM3 (ref 3.65–5.03)

## 2022-06-17 PROCEDURE — 85025 COMPLETE CBC W/AUTO DIFF WBC: CPT

## 2022-06-17 PROCEDURE — 80061 LIPID PANEL: CPT

## 2022-06-17 PROCEDURE — 84443 ASSAY THYROID STIM HORMONE: CPT

## 2022-06-17 PROCEDURE — 83036 HEMOGLOBIN GLYCOSYLATED A1C: CPT

## 2022-06-17 PROCEDURE — 82306 VITAMIN D 25 HYDROXY: CPT

## 2022-06-17 PROCEDURE — 36415 COLL VENOUS BLD VENIPUNCTURE: CPT

## 2022-06-17 PROCEDURE — 80053 COMPREHEN METABOLIC PANEL: CPT
